# Patient Record
(demographics unavailable — no encounter records)

---

## 2024-11-22 NOTE — PHYSICAL EXAM
[de-identified] : Bilateral TM with Rooney tymponostomy tubes in place and patent.  [Midline] : trachea located in midline position [Normal] : palpation of lymph nodes is normal

## 2024-11-22 NOTE — REASON FOR VISIT
[Subsequent Evaluation] : a subsequent evaluation for [FreeTextEntry2] : s/p bilateral myringotomy with tube placement

## 2024-11-22 NOTE — ASSESSMENT
[FreeTextEntry1] : Bilateral eustachian tube dysfunction, s/p myringotomy tubes.  Follow up in 6 months for next ear tube check. Ofloxacin ear drops and tube otorrhea plan discussed - no evidence of it today.     Total time spent on patient encounter including review of patient's medical history, physical examination, interpretation of any indicated labs / imaging and counseling, excluding time spent performing any indicated procedures: 20-29 minutes.    Dax Taylor MD, MPH Director of Pediatric Otolaryngology [facility]

## 2024-11-22 NOTE — HISTORY OF PRESENT ILLNESS
[FreeTextEntry1] : Patient returns today s/p bilateral myringotomy with tube placement 7/25/24. Accompanied by mother. Mother states that he is doing well. Had one ear infection since surgery. Ear infection was about 1 month ago. Used Amoxicillin with improvement. Did not need to use Ofloxacin